# Patient Record
Sex: FEMALE | Race: WHITE | Employment: OTHER | URBAN - METROPOLITAN AREA
[De-identification: names, ages, dates, MRNs, and addresses within clinical notes are randomized per-mention and may not be internally consistent; named-entity substitution may affect disease eponyms.]

---

## 2019-06-02 ENCOUNTER — HOSPITAL ENCOUNTER (INPATIENT)
Age: 84
LOS: 1 days | Discharge: HOME | DRG: 313 | End: 2019-06-03
Attending: EMERGENCY MEDICINE | Admitting: INTERNAL MEDICINE
Payer: MEDICARE

## 2019-06-02 ENCOUNTER — APPOINTMENT (OUTPATIENT)
Dept: GENERAL RADIOLOGY | Age: 84
DRG: 313 | End: 2019-06-02
Payer: MEDICARE

## 2019-06-02 DIAGNOSIS — R11.2 NON-INTRACTABLE VOMITING WITH NAUSEA, UNSPECIFIED VOMITING TYPE: ICD-10-CM

## 2019-06-02 DIAGNOSIS — R07.9 CHEST PAIN, UNSPECIFIED TYPE: Primary | ICD-10-CM

## 2019-06-02 LAB
ALBUMIN SERPL-MCNC: 4.3 G/DL (ref 3.4–5)
ALP BLD-CCNC: 79 U/L (ref 40–129)
ALT SERPL-CCNC: 28 U/L (ref 10–40)
ANION GAP SERPL CALCULATED.3IONS-SCNC: 12 MMOL/L (ref 3–16)
AST SERPL-CCNC: 45 U/L (ref 15–37)
BASOPHILS ABSOLUTE: 0.1 K/UL (ref 0–0.2)
BASOPHILS RELATIVE PERCENT: 0.4 %
BILIRUB SERPL-MCNC: 1.1 MG/DL (ref 0–1)
BILIRUBIN DIRECT: 0.5 MG/DL (ref 0–0.3)
BILIRUBIN, INDIRECT: 0.6 MG/DL (ref 0–1)
BUN BLDV-MCNC: 30 MG/DL (ref 7–20)
CALCIUM SERPL-MCNC: 9.8 MG/DL (ref 8.3–10.6)
CHLORIDE BLD-SCNC: 94 MMOL/L (ref 99–110)
CO2: 29 MMOL/L (ref 21–32)
CREAT SERPL-MCNC: 1.2 MG/DL (ref 0.6–1.2)
EOSINOPHILS ABSOLUTE: 0.1 K/UL (ref 0–0.6)
EOSINOPHILS RELATIVE PERCENT: 1.1 %
GFR AFRICAN AMERICAN: 52
GFR NON-AFRICAN AMERICAN: 43
GLUCOSE BLD-MCNC: 121 MG/DL (ref 70–99)
HCT VFR BLD CALC: 33.9 % (ref 36–48)
HEMOGLOBIN: 11.2 G/DL (ref 12–16)
LIPASE: 32 U/L (ref 13–60)
LYMPHOCYTES ABSOLUTE: 0.8 K/UL (ref 1–5.1)
LYMPHOCYTES RELATIVE PERCENT: 6.9 %
MCH RBC QN AUTO: 31.2 PG (ref 26–34)
MCHC RBC AUTO-ENTMCNC: 33.2 G/DL (ref 31–36)
MCV RBC AUTO: 93.8 FL (ref 80–100)
MONOCYTES ABSOLUTE: 0.7 K/UL (ref 0–1.3)
MONOCYTES RELATIVE PERCENT: 5.9 %
NEUTROPHILS ABSOLUTE: 10.2 K/UL (ref 1.7–7.7)
NEUTROPHILS RELATIVE PERCENT: 85.7 %
PDW BLD-RTO: 13.3 % (ref 12.4–15.4)
PERFORMED ON: NORMAL
PLATELET # BLD: 275 K/UL (ref 135–450)
PMV BLD AUTO: 7.7 FL (ref 5–10.5)
POC SAMPLE TYPE: NORMAL
POC TROPONIN I: 0.01 NG/ML (ref 0–0.1)
POTASSIUM REFLEX MAGNESIUM: 4.6 MMOL/L (ref 3.5–5.1)
PRO-BNP: 330 PG/ML (ref 0–449)
RBC # BLD: 3.61 M/UL (ref 4–5.2)
SODIUM BLD-SCNC: 135 MMOL/L (ref 136–145)
TOTAL PROTEIN: 7.1 G/DL (ref 6.4–8.2)
TROPONIN: 0.02 NG/ML
TROPONIN: <0.01 NG/ML
TROPONIN: <0.01 NG/ML
WBC # BLD: 11.9 K/UL (ref 4–11)

## 2019-06-02 PROCEDURE — 84484 ASSAY OF TROPONIN QUANT: CPT

## 2019-06-02 PROCEDURE — 6370000000 HC RX 637 (ALT 250 FOR IP): Performed by: INTERNAL MEDICINE

## 2019-06-02 PROCEDURE — 93005 ELECTROCARDIOGRAM TRACING: CPT | Performed by: PHYSICIAN ASSISTANT

## 2019-06-02 PROCEDURE — 6370000000 HC RX 637 (ALT 250 FOR IP): Performed by: PHYSICIAN ASSISTANT

## 2019-06-02 PROCEDURE — 80061 LIPID PANEL: CPT

## 2019-06-02 PROCEDURE — 36415 COLL VENOUS BLD VENIPUNCTURE: CPT

## 2019-06-02 PROCEDURE — 85025 COMPLETE CBC W/AUTO DIFF WBC: CPT

## 2019-06-02 PROCEDURE — 2580000003 HC RX 258: Performed by: INTERNAL MEDICINE

## 2019-06-02 PROCEDURE — 83880 ASSAY OF NATRIURETIC PEPTIDE: CPT

## 2019-06-02 PROCEDURE — 71046 X-RAY EXAM CHEST 2 VIEWS: CPT

## 2019-06-02 PROCEDURE — 6360000002 HC RX W HCPCS: Performed by: INTERNAL MEDICINE

## 2019-06-02 PROCEDURE — 93005 ELECTROCARDIOGRAM TRACING: CPT | Performed by: EMERGENCY MEDICINE

## 2019-06-02 PROCEDURE — 1200000000 HC SEMI PRIVATE

## 2019-06-02 PROCEDURE — 80048 BASIC METABOLIC PNL TOTAL CA: CPT

## 2019-06-02 PROCEDURE — 83690 ASSAY OF LIPASE: CPT

## 2019-06-02 PROCEDURE — 96372 THER/PROPH/DIAG INJ SC/IM: CPT

## 2019-06-02 PROCEDURE — 80076 HEPATIC FUNCTION PANEL: CPT

## 2019-06-02 PROCEDURE — 99285 EMERGENCY DEPT VISIT HI MDM: CPT

## 2019-06-02 RX ORDER — ATORVASTATIN CALCIUM 40 MG/1
40 TABLET, FILM COATED ORAL NIGHTLY
Status: DISCONTINUED | OUTPATIENT
Start: 2019-06-02 | End: 2019-06-03 | Stop reason: HOSPADM

## 2019-06-02 RX ORDER — LISINOPRIL 10 MG/1
40 TABLET ORAL DAILY
COMMUNITY

## 2019-06-02 RX ORDER — ATORVASTATIN CALCIUM 40 MG/1
40 TABLET, FILM COATED ORAL NIGHTLY
COMMUNITY

## 2019-06-02 RX ORDER — ATORVASTATIN CALCIUM 40 MG/1
80 TABLET, FILM COATED ORAL NIGHTLY
Status: DISCONTINUED | OUTPATIENT
Start: 2019-06-02 | End: 2019-06-02 | Stop reason: DRUGHIGH

## 2019-06-02 RX ORDER — AMLODIPINE BESYLATE 5 MG/1
5 TABLET ORAL DAILY
Status: DISCONTINUED | OUTPATIENT
Start: 2019-06-02 | End: 2019-06-02 | Stop reason: DRUGHIGH

## 2019-06-02 RX ORDER — AMLODIPINE BESYLATE 10 MG/1
10 TABLET ORAL NIGHTLY
COMMUNITY

## 2019-06-02 RX ORDER — NITROGLYCERIN 0.4 MG/1
0.4 TABLET SUBLINGUAL EVERY 5 MIN PRN
Status: DISCONTINUED | OUTPATIENT
Start: 2019-06-02 | End: 2019-06-03 | Stop reason: HOSPADM

## 2019-06-02 RX ORDER — SODIUM CHLORIDE 0.9 % (FLUSH) 0.9 %
10 SYRINGE (ML) INJECTION EVERY 12 HOURS SCHEDULED
Status: DISCONTINUED | OUTPATIENT
Start: 2019-06-02 | End: 2019-06-03 | Stop reason: HOSPADM

## 2019-06-02 RX ORDER — ASPIRIN 81 MG/1
81 TABLET, CHEWABLE ORAL DAILY
Status: DISCONTINUED | OUTPATIENT
Start: 2019-06-03 | End: 2019-06-03 | Stop reason: HOSPADM

## 2019-06-02 RX ORDER — HYDROCHLOROTHIAZIDE 25 MG/1
25 TABLET ORAL DAILY
Status: DISCONTINUED | OUTPATIENT
Start: 2019-06-03 | End: 2019-06-03 | Stop reason: HOSPADM

## 2019-06-02 RX ORDER — AMLODIPINE BESYLATE 10 MG/1
10 TABLET ORAL NIGHTLY
Status: DISCONTINUED | OUTPATIENT
Start: 2019-06-02 | End: 2019-06-03 | Stop reason: HOSPADM

## 2019-06-02 RX ORDER — LISINOPRIL 40 MG/1
40 TABLET ORAL DAILY
Status: DISCONTINUED | OUTPATIENT
Start: 2019-06-03 | End: 2019-06-03 | Stop reason: HOSPADM

## 2019-06-02 RX ORDER — SODIUM CHLORIDE 0.9 % (FLUSH) 0.9 %
10 SYRINGE (ML) INJECTION PRN
Status: DISCONTINUED | OUTPATIENT
Start: 2019-06-02 | End: 2019-06-03 | Stop reason: HOSPADM

## 2019-06-02 RX ORDER — LEVOTHYROXINE SODIUM 88 UG/1
88 TABLET ORAL DAILY
Status: DISCONTINUED | OUTPATIENT
Start: 2019-06-03 | End: 2019-06-03 | Stop reason: HOSPADM

## 2019-06-02 RX ORDER — HYDROCHLOROTHIAZIDE 25 MG/1
25 TABLET ORAL DAILY
COMMUNITY

## 2019-06-02 RX ORDER — ONDANSETRON 2 MG/ML
4 INJECTION INTRAMUSCULAR; INTRAVENOUS EVERY 6 HOURS PRN
Status: DISCONTINUED | OUTPATIENT
Start: 2019-06-02 | End: 2019-06-03 | Stop reason: HOSPADM

## 2019-06-02 RX ORDER — ASPIRIN 81 MG/1
324 TABLET, CHEWABLE ORAL ONCE
Status: COMPLETED | OUTPATIENT
Start: 2019-06-02 | End: 2019-06-02

## 2019-06-02 RX ADMIN — AMLODIPINE BESYLATE 10 MG: 10 TABLET ORAL at 22:12

## 2019-06-02 RX ADMIN — ASPIRIN 81 MG 324 MG: 81 TABLET ORAL at 17:29

## 2019-06-02 RX ADMIN — ATORVASTATIN CALCIUM 40 MG: 40 TABLET, FILM COATED ORAL at 22:12

## 2019-06-02 RX ADMIN — ENOXAPARIN SODIUM 40 MG: 40 INJECTION SUBCUTANEOUS at 22:12

## 2019-06-02 RX ADMIN — Medication 10 ML: at 22:12

## 2019-06-02 RX ADMIN — LIDOCAINE HYDROCHLORIDE: 20 SOLUTION ORAL; TOPICAL at 17:38

## 2019-06-02 ASSESSMENT — ENCOUNTER SYMPTOMS
ABDOMINAL PAIN: 0
DIARRHEA: 0
SHORTNESS OF BREATH: 0
COUGH: 0
SORE THROAT: 0
BLOOD IN STOOL: 0
NAUSEA: 1
VOMITING: 1

## 2019-06-02 ASSESSMENT — PAIN DESCRIPTION - DESCRIPTORS: DESCRIPTORS: DULL

## 2019-06-02 ASSESSMENT — PAIN SCALES - GENERAL
PAINLEVEL_OUTOF10: 5
PAINLEVEL_OUTOF10: 6

## 2019-06-02 ASSESSMENT — PAIN DESCRIPTION - LOCATION: LOCATION: CHEST

## 2019-06-02 ASSESSMENT — PAIN DESCRIPTION - PAIN TYPE: TYPE: ACUTE PAIN

## 2019-06-02 ASSESSMENT — PAIN DESCRIPTION - ORIENTATION: ORIENTATION: MID

## 2019-06-02 NOTE — ED PROVIDER NOTES
ED Attending Attestation Note     Date of evaluation: 6/2/2019    This patient was seen by the advance practice provider. I have seen and examined the patient, agree with the workup, evaluation, management and diagnosis. The care plan has been discussed. I have reviewed the ECG and concur with the JESS's interpretation. My assessment reveals ill-appearing female in no acute distress who presents today with nausea and emesis chest pain. The patient felt well this morning and then at lunch today had a meal felt nauseous and had an episode of emesis. Had chest pain. She states she is not sure whether she had chest pain prior to vomiting or after the attack pain continued. This was about 12 or 12:30 and she presents today here around 4:30. She endorses mild anterior chest pain, no sob.   She has clear lung sounds bilaterally, regular rate and rhythm with a soft and benign abdominal examination and no lower extremity swelling     Tereza Angulo MD  06/02/19 [id number]

## 2019-06-02 NOTE — ED PROVIDER NOTES
[address] [facility] [address] ENCOUNTER          PHYSICIAN ASSISTANT NOTE       Date of evaluation: 6/2/2019    Chief Complaint     Chest Pain and Emesis      History of Present Illness     Judy Peterson is a 80 y.o. female who presents with chest pain. Patient states that she ate some lunch. She developed some indigestion which is not atypical for her. She states she did vomit once and then developed some chest pressure. She states she has never had a chest pain like this in the past.  She has had a stress test back in 2014 which was negative. She states that the pressure has not gone away. Over the past year she has been having some intermittent shortness of breath with exertion. She did not think much of it. She denies any cough congestion sore throat hemoptysis. No new swelling or pain in her legs. She denies any recent trauma surgeries or immobilizations. She does still have a gallbladder. She denies any abdominal pain. No fevers or chills. No history of DVT or PE and she has had a mini stroke in the past however no history of MI or CVA. Review of Systems     Review of Systems   Constitutional: Negative for chills and fever. HENT: Negative for ear pain and sore throat. Respiratory: Negative for cough and shortness of breath. Cardiovascular: Positive for chest pain and leg swelling (chronic). Gastrointestinal: Positive for nausea and vomiting. Negative for abdominal pain, blood in stool and diarrhea. Genitourinary: Negative for dysuria and hematuria. Skin: Negative for rash and wound. Neurological: Negative for light-headedness, numbness and headaches. Past Medical, Surgical, Family, and Social History     She has a past medical history of Cancer ([address] Hyperlipidemia, Hypertension, and Thyroid disease. She has a past surgical history that includes Breast surgery. Her family history is not on file. She reports that she has quit smoking.  She has never used Abnormal EKG: Sinus rhythm rate of 64  Comparison:  2016 which does show change    Repeat EKG shows normal sinus rhythm rate 55. Changes appear to have resolved. RADIOLOGY:  XR CHEST STANDARD (2 VW)   Final Result      No active cardiopulmonary disease.                       LABS:   Results for orders placed or performed during the hospital encounter of 06/02/19   Hepatic Function Panel   Result Value Ref Range    Total Protein 7.1 6.4 - 8.2 g/dL    Alb 4.3 3.4 - 5.0 g/dL    Alkaline Phosphatase 79 40 - 129 U/L    ALT 28 10 - 40 U/L    AST 45 (H) 15 - 37 U/L    Total Bilirubin 1.1 (H) 0.0 - 1.0 mg/dL    Bilirubin, Direct 0.5 (H) 0.0 - 0.3 mg/dL    Bilirubin, Indirect 0.6 0.0 - 1.0 mg/dL   Lipase   Result Value Ref Range    Lipase 32.0 13.0 - 60.0 U/L   CBC Auto Differential   Result Value Ref Range    WBC 11.9 (H) 4.0 - 11.0 K/uL    RBC 3.61 (L) 4.00 - 5.20 M/uL    Hemoglobin 11.2 (L) 12.0 - 16.0 g/dL    Hematocrit 33.9 (L) 36.0 - 48.0 %    MCV 93.8 80.0 - 100.0 fL    MCH 31.2 26.0 - 34.0 pg    MCHC 33.2 31.0 - 36.0 g/dL    RDW 13.3 12.4 - 15.4 %    Platelets 972 901 - 744 K/uL    MPV 7.7 5.0 - 10.5 fL    Neutrophils % 85.7 %    Lymphocytes % 6.9 %    Monocytes % 5.9 %    Eosinophils % 1.1 %    Basophils % 0.4 %    Neutrophils # 10.2 (H) 1.7 - 7.7 K/uL    Lymphocytes # 0.8 (L) 1.0 - 5.1 K/uL    Monocytes # 0.7 0.0 - 1.3 K/uL    Eosinophils # 0.1 0.0 - 0.6 K/uL    Basophils # 0.1 0.0 - 0.2 K/uL   Basic Metabolic Panel w/ Reflex to MG   Result Value Ref Range    Sodium 135 (L) 136 - 145 mmol/L    Potassium reflex Magnesium 4.6 3.5 - 5.1 mmol/L    Chloride 94 (L) 99 - 110 mmol/L    CO2 29 21 - 32 mmol/L    Anion Gap 12 3 - 16    Glucose 121 (H) 70 - 99 mg/dL    BUN 30 (H) 7 - 20 mg/dL    CREATININE 1.2 0.6 - 1.2 mg/dL    GFR Non- 43 (A) >60    GFR  52 (A) >60    Calcium 9.8 8.3 - 10.6 mg/dL   Brain Natriuretic Peptide   Result Value Ref Range    Pro- 0 - 449 pg/mL   Troponin

## 2019-06-02 NOTE — ED TRIAGE NOTES
Pt arrived to ED with chest pain and vomiting that started around 1200. States she started with heartburn after eating some fruit and then began vomiting. After vomiting her chest pain became more intense. Took antacid with minimal improvement.

## 2019-06-03 VITALS
SYSTOLIC BLOOD PRESSURE: 120 MMHG | RESPIRATION RATE: 18 BRPM | TEMPERATURE: 97.9 F | HEART RATE: 60 BPM | HEIGHT: 66 IN | OXYGEN SATURATION: 97 % | BODY MASS INDEX: 24.6 KG/M2 | WEIGHT: 153.1 LBS | DIASTOLIC BLOOD PRESSURE: 72 MMHG

## 2019-06-03 LAB
ALBUMIN SERPL-MCNC: 3.8 G/DL (ref 3.4–5)
ANION GAP SERPL CALCULATED.3IONS-SCNC: 10 MMOL/L (ref 3–16)
BILIRUBIN URINE: NEGATIVE
BLOOD, URINE: ABNORMAL
BUN BLDV-MCNC: 22 MG/DL (ref 7–20)
CALCIUM SERPL-MCNC: 9.5 MG/DL (ref 8.3–10.6)
CHLORIDE BLD-SCNC: 100 MMOL/L (ref 99–110)
CHOLESTEROL, TOTAL: 155 MG/DL (ref 0–199)
CLARITY: CLEAR
CO2: 29 MMOL/L (ref 21–32)
COLOR: YELLOW
CREAT SERPL-MCNC: 1 MG/DL (ref 0.6–1.2)
EKG ATRIAL RATE: 56 BPM
EKG ATRIAL RATE: 64 BPM
EKG ATRIAL RATE: 65 BPM
EKG DIAGNOSIS: NORMAL
EKG P AXIS: 38 DEGREES
EKG P AXIS: 53 DEGREES
EKG P AXIS: 60 DEGREES
EKG P-R INTERVAL: 170 MS
EKG P-R INTERVAL: 184 MS
EKG P-R INTERVAL: 186 MS
EKG Q-T INTERVAL: 422 MS
EKG Q-T INTERVAL: 434 MS
EKG Q-T INTERVAL: 446 MS
EKG QRS DURATION: 86 MS
EKG QRS DURATION: 86 MS
EKG QRS DURATION: 92 MS
EKG QTC CALCULATION (BAZETT): 430 MS
EKG QTC CALCULATION (BAZETT): 435 MS
EKG QTC CALCULATION (BAZETT): 451 MS
EKG R AXIS: 18 DEGREES
EKG R AXIS: 6 DEGREES
EKG R AXIS: 8 DEGREES
EKG T AXIS: 43 DEGREES
EKG T AXIS: 53 DEGREES
EKG T AXIS: 55 DEGREES
EKG VENTRICULAR RATE: 56 BPM
EKG VENTRICULAR RATE: 64 BPM
EKG VENTRICULAR RATE: 65 BPM
GFR AFRICAN AMERICAN: >60
GFR NON-AFRICAN AMERICAN: 53
GLUCOSE BLD-MCNC: 88 MG/DL (ref 70–99)
GLUCOSE URINE: NEGATIVE MG/DL
HCT VFR BLD CALC: 32 % (ref 36–48)
HDLC SERPL-MCNC: 74 MG/DL (ref 40–60)
HEMOGLOBIN: 10.8 G/DL (ref 12–16)
KETONES, URINE: NEGATIVE MG/DL
LDL CHOLESTEROL CALCULATED: 69 MG/DL
LEUKOCYTE ESTERASE, URINE: NEGATIVE
LV EF: 58 %
LV EF: 70 %
LVEF MODALITY: NORMAL
LVEF MODALITY: NORMAL
MCH RBC QN AUTO: 31.9 PG (ref 26–34)
MCHC RBC AUTO-ENTMCNC: 33.7 G/DL (ref 31–36)
MCV RBC AUTO: 94.6 FL (ref 80–100)
MICROSCOPIC EXAMINATION: YES
NITRITE, URINE: NEGATIVE
PDW BLD-RTO: 13.7 % (ref 12.4–15.4)
PH UA: 7 (ref 5–8)
PHOSPHORUS: 3.1 MG/DL (ref 2.5–4.9)
PLATELET # BLD: 239 K/UL (ref 135–450)
PMV BLD AUTO: 7.4 FL (ref 5–10.5)
POTASSIUM SERPL-SCNC: 4.1 MMOL/L (ref 3.5–5.1)
PROTEIN UA: NEGATIVE MG/DL
RBC # BLD: 3.38 M/UL (ref 4–5.2)
RBC UA: NORMAL /HPF (ref 0–2)
SODIUM BLD-SCNC: 139 MMOL/L (ref 136–145)
SPECIFIC GRAVITY UA: <=1.005 (ref 1–1.03)
TRIGL SERPL-MCNC: 62 MG/DL (ref 0–150)
URINE REFLEX TO CULTURE: ABNORMAL
URINE TYPE: ABNORMAL
UROBILINOGEN, URINE: 0.2 E.U./DL
VLDLC SERPL CALC-MCNC: 12 MG/DL
WBC # BLD: 7.2 K/UL (ref 4–11)
WBC UA: NORMAL /HPF (ref 0–5)

## 2019-06-03 PROCEDURE — 99223 1ST HOSP IP/OBS HIGH 75: CPT | Performed by: INTERNAL MEDICINE

## 2019-06-03 PROCEDURE — 6370000000 HC RX 637 (ALT 250 FOR IP): Performed by: INTERNAL MEDICINE

## 2019-06-03 PROCEDURE — 36415 COLL VENOUS BLD VENIPUNCTURE: CPT

## 2019-06-03 PROCEDURE — 93005 ELECTROCARDIOGRAM TRACING: CPT | Performed by: INTERNAL MEDICINE

## 2019-06-03 PROCEDURE — 6360000002 HC RX W HCPCS: Performed by: INTERNAL MEDICINE

## 2019-06-03 PROCEDURE — 80069 RENAL FUNCTION PANEL: CPT

## 2019-06-03 PROCEDURE — 2580000003 HC RX 258: Performed by: INTERNAL MEDICINE

## 2019-06-03 PROCEDURE — 78452 HT MUSCLE IMAGE SPECT MULT: CPT

## 2019-06-03 PROCEDURE — 3430000000 HC RX DIAGNOSTIC RADIOPHARMACEUTICAL: Performed by: INTERNAL MEDICINE

## 2019-06-03 PROCEDURE — A9502 TC99M TETROFOSMIN: HCPCS | Performed by: INTERNAL MEDICINE

## 2019-06-03 PROCEDURE — 93306 TTE W/DOPPLER COMPLETE: CPT

## 2019-06-03 PROCEDURE — 93017 CV STRESS TEST TRACING ONLY: CPT

## 2019-06-03 PROCEDURE — 81001 URINALYSIS AUTO W/SCOPE: CPT

## 2019-06-03 PROCEDURE — 93010 ELECTROCARDIOGRAM REPORT: CPT | Performed by: INTERNAL MEDICINE

## 2019-06-03 PROCEDURE — 85027 COMPLETE CBC AUTOMATED: CPT

## 2019-06-03 RX ADMIN — Medication 10 ML: at 10:03

## 2019-06-03 RX ADMIN — LISINOPRIL 40 MG: 40 TABLET ORAL at 11:56

## 2019-06-03 RX ADMIN — Medication 10 ML: at 08:39

## 2019-06-03 RX ADMIN — TETROFOSMIN 10 MILLICURIE: 1.38 INJECTION, POWDER, LYOPHILIZED, FOR SOLUTION INTRAVENOUS at 08:39

## 2019-06-03 RX ADMIN — REGADENOSON 0.4 MG: 0.08 INJECTION, SOLUTION INTRAVENOUS at 10:03

## 2019-06-03 RX ADMIN — TETROFOSMIN 30 MILLICURIE: 1.38 INJECTION, POWDER, LYOPHILIZED, FOR SOLUTION INTRAVENOUS at 10:03

## 2019-06-03 RX ADMIN — HYDROCHLOROTHIAZIDE 25 MG: 25 TABLET ORAL at 11:55

## 2019-06-03 RX ADMIN — Medication 10 ML: at 11:56

## 2019-06-03 ASSESSMENT — PAIN SCALES - GENERAL
PAINLEVEL_OUTOF10: 0

## 2019-06-03 NOTE — PROGRESS NOTES
Admission: Patient received to room 6311 from ED. Patient admitted with Dx of Indigestion/cardiac workup. Patient A&Ox4 upon arrival. Patient denies c/o upon arrival.Tele monitor applied, rate and rhythm verified with monitor reader. Admission assessment as charted. VSS. Patient oriented to room, staff, and call system. Educated on fall protocol and hourly rounding. Patient informed to utilize call light with any needs. Pt verbalized understanding. Will continue to monitor.

## 2019-06-03 NOTE — PROGRESS NOTES
Pt has been NPO since midnight and without caffeine for Stress Test today. Per Dr. Mayra Steiner, hold all morning meds until after test. Pt is aware of plan and agreeable, will continue to monitor.

## 2019-06-03 NOTE — DISCHARGE SUMMARY
Pt discharge to home with daughter. Discharge paper work discussed with patient and denies any further questions . IV removed. Took patient down to lobby with daughter in to private vehicle.

## 2019-06-03 NOTE — CONSULTS
sodium chloride flush  10 mL Intravenous 2 times per day    enoxaparin  40 mg Subcutaneous Nightly    amLODIPine  10 mg Oral Nightly    atorvastatin  40 mg Oral Nightly     sodium chloride flush, magnesium hydroxide, ondansetron, nitroGLYCERIN      Allergies:  Keflet [cephalexin]     Review of Systems:   All 12 point review of symptoms completed. Pertinent positives identified in the HPI, all other review of symptoms negative as below. · Constitutional: there has been no unanticipated weight loss. · Eyes: No visual changes or diplopia. No scleral icterus. · ENT: No Headaches, hearing loss or vertigo. No mouth sores or sore throat. · Cardiovascular: No loss of consciousness. No hemoptysis, pleuritic pain, or phlebitis. · Respiratory: No cough or wheezing, no sputum production. No hematemesis. · Gastrointestinal: No abdominal pain, appetite loss, blood in stools. No change in bowel or bladder habits. · Genitourinary: No dysuria, or hematuria. · Musculoskeletal:  No gait disturbance, weakness or joint complaints. · Integumentary: No rash or pruritis. · Neurological: No headache, diplopia,numbness or tingling. No change in gait, balance, coordination, mood, affect, memory, mentation, behavior. · Psychiatric: No anxiety,  · Endocrine: No malaise,  · Hematologic/Lymphatic: No abnormal bruising  · Allergic/Immunologic: No nasal congestion      Physical Examination:    Vitals:    06/03/19 1155   BP: 120/72   Pulse: 60   Resp: 18   Temp: 97.9 °F (36.6 °C)   SpO2: 97%    Weight: 153 lb 1.6 oz (69.4 kg)         General Appearance:  Alert, cooperative, no distress, appears stated age   Head:  Normocephalic, without obvious abnormality, atraumatic   Eyes:  PERRL   Nose: Nares normal,   Neck: Supple, JVP normal    Lungs:   Clear to auscultation bilaterally, respirations unlabored   Chest Wall:  No tenderness or deformity   Heart:  Regular rate and rhythm, normal S1, S2 normal, no murmur, II/VI HSM,  No rub.  No S3 / S4  gallop   Abdomen:   Soft, non-tender, +bowel sounds   Extremities: no cyanosis, no clubbing , No LE edema   Pulses: Symmetric extremities   Skin:  no gross lesions or rashes   Pysch: Normal mood and affect   Neurologic: No gross deficits. CN II - XII grossly intact        Labs  CBC:   Lab Results   Component Value Date    WBC 7.2 06/03/2019    RBC 3.38 06/03/2019    HGB 10.8 06/03/2019    HCT 32.0 06/03/2019    MCV 94.6 06/03/2019    RDW 13.7 06/03/2019     06/03/2019     CMP:    Lab Results   Component Value Date     06/03/2019    K 4.1 06/03/2019    K 4.6 06/02/2019     06/03/2019    CO2 29 06/03/2019    BUN 22 06/03/2019    CREATININE 1.0 06/03/2019    GFRAA >60 06/03/2019    LABGLOM 53 06/03/2019    GLUCOSE 88 06/03/2019    PROT 7.1 06/02/2019    CALCIUM 9.5 06/03/2019    BILITOT 1.1 06/02/2019    ALKPHOS 79 06/02/2019    AST 45 06/02/2019    ALT 28 06/02/2019     PT/INR:  No results found for: PTINR  Recent Labs     06/02/19  1623 06/02/19  1951 06/02/19  2249   TROPONINI <0.01 <0.01 0.02*       EKG:  SR with minimal NSST/T wave changes    Assessment  Patient Active Problem List   Diagnosis    Hypertensive urgency    Hypertensive emergency without congestive heart failure    Chest pain        Impression:  Atypical chest pain. Recommendations:    I had the opportunity to review the clinical symptoms and presentation of [employer]. I recommend that the patient undergo further cardiac evaluation with an echo gxt myoview nnd if ischemia is present then would perform cardiac cath. Tobacco use was discussed with the patient and educated on the negative effects. I have asked the patient to not utilize these agents. Thank you for allowing to us to participate in the care or [facility]. All questions and concerns were addressed to the patient. Alternatives to my treatment were discussed. The note was completed using EMR.  Every effort was made to ensure accuracy; however, inadvertent computerized transcription errors may be present.        Melania Magaña MD [facility]

## 2019-06-03 NOTE — PLAN OF CARE
Problem: Pain:  Goal: Control of acute pain  Description  Control of acute pain  Outcome: Ongoing  Note:   Pt denies any pain; states the GI cocktail has completely gotten rid of any pain. Pt instructed to call RN if pain returns. Belching frequently, states she does feel hungry and is able to drink gingerale (no caffeine) and eat jello and saltine crackers. Now NPO at midnight for testing in the AM. Will continue to monitor. Pt is a low fall risk. Alert and oriented x4, walks with steady gait independently as witnessed by this RN. Pt is ambulatory at baseline and requires no aide device. Calls appropriately. No slip socks in place, bedside table and call light within reach. Will continue to monitor.

## 2019-06-03 NOTE — DISCHARGE SUMMARY
Hospital Medicine Discharge Summary    Patient: Cecille Martin     Gender: female  : 1934   Age: 80 y.o. MRN: [id number]    Code Status: Full Code    Primary Care Provider: Milka Walker Date: 2019   Discharge Date: 19    Admitting Physician: Gay Perez MD  Discharge Physician: Emmanuel Carrington MD     Discharge Diagnoses: Active Hospital Problems    Diagnosis Date Noted    Chest pain [R07.9] 2019       Hospital Course:   Pt admitted on 2019 for the symptoms(s) of chest pain. The pain was atypical on presentation, however her troponin bumped up from <0.01 to 0.02. Stress test and echocardiogram were done, but did not reveal any concern for any CAD. Her chest brian improved with GI cocktail. She was discharged in a stable condition and asked to follow-up as an outpatient. Disposition:  Home    Exam:     /72   Pulse 60   Temp 97.9 °F (36.6 °C) (Oral)   Resp 18   Ht 5' 6\" (1.676 m)   Wt 153 lb 1.6 oz (69.4 kg)   SpO2 97%   BMI 24.71 kg/m²     General appearance:  No apparent distress, appears stated age and cooperative. HEENT:  Normal cephalic, atraumatic without obvious deformity. Pupils equal, round, and reactive to light. Extra ocular muscles intact. Conjunctivae/corneas clear. Neck: Supple, with full range of motion. No jugular venous distention. Trachea midline. Respiratory:  Normal respiratory effort. Clear to auscultation, bilaterally without Rales/Wheezes/Rhonchi. Cardiovascular:  Regular rate and rhythm with normal S1/S2 without murmurs, rubs or gallops. Abdomen: Soft, non-tender, non-distended with normal bowel sounds. Musculoskeletal:  No clubbing, cyanosis or edema bilaterally. Full range of motion without deformity. Skin: Skin color, texture, turgor normal.  No rashes or lesions. Neurologic:  Neurovascularly intact without any focal sensory/motor deficits.  Cranial nerves: II-XII intact, grossly non-focal.  Psychiatric:  Alert and oriented, thought content appropriate, normal insight    Consults:     IP CONSULT TO HOSPITALIST  IP CONSULT TO CARDIOLOGY    Labs: For convenience and continuity at follow-up the following most recent labs are provided:    Lab Results   Component Value Date    WBC 7.2 06/03/2019    HGB 10.8 06/03/2019    HCT 32.0 06/03/2019    MCV 94.6 06/03/2019     06/03/2019     06/03/2019    K 4.1 06/03/2019    K 4.6 06/02/2019     06/03/2019    CO2 29 06/03/2019    BUN 22 06/03/2019    CREATININE 1.0 06/03/2019    CALCIUM 9.5 06/03/2019    PHOS 3.1 06/03/2019    .0 07/02/2016    ALKPHOS 79 06/02/2019    ALT 28 06/02/2019    AST 45 06/02/2019    BILITOT 1.1 06/02/2019    BILIDIR 0.5 06/02/2019    LABALBU 3.8 06/03/2019    LDLCALC 69 06/02/2019    TRIG 62 06/02/2019     No results found for: INR    Radiology:  NM Cardiac Stress Test Nuclear Imaging   Final Result      XR CHEST STANDARD (2 VW)   Final Result      No active cardiopulmonary disease. Discharge Medications:   Current Discharge Medication List        Current Discharge Medication List        Current Discharge Medication List      CONTINUE these medications which have NOT CHANGED    Details   lisinopril (PRINIVIL;ZESTRIL) 10 MG tablet Take 40 mg by mouth daily      hydrochlorothiazide (HYDRODIURIL) 25 MG tablet Take 25 mg by mouth daily      atorvastatin (LIPITOR) 40 MG tablet Take 40 mg by mouth nightly      amLODIPine (NORVASC) 10 MG tablet Take 10 mg by mouth nightly      Blood Pressure KIT Check blood pressure at least once a day, preferably before taking blood pressure medications.   Qty: 1 kit, Refills: 0      aspirin 81 MG chewable tablet Take 1 tablet by mouth daily  Qty: 30 tablet, Refills: 3      levothyroxine (SYNTHROID) 88 MCG tablet Take 1 tablet by mouth Daily  Qty: 30 tablet, Refills: 3           Current Discharge Medication List          Follow-up appointments and focus:    Please follow up with your primary care provider in 1-2 weeks for routine hospital follow up. Condition at Discharge:  Stable    The patient was seen and examined on day of discharge and this discharge summary is in conjunction with any daily progress note from day of discharge. Time Spent on discharge is 30 minutes  in the examination, evaluation, counseling and review of medications and discharge plan. Signed:    Julia Rich, PGY3  6/3/2019     Thank you Camille Gamez for the opportunity to be involved in this patient's care.  If you have any questions or concerns please feel free to contact me

## 2019-06-03 NOTE — PROGRESS NOTES
4 Eyes Admission Assessment     I agree as the admission nurse that 2 RN's have performed a thorough Head to Toe Skin Assessment on the patient. ALL assessment sites listed below have been assessed on admission. Areas assessed by both nurses: yes  [x]   Head, Face, and Ears   [x]   Shoulders, Back, and Chest  [x]   Arms, Elbows, and Hands   [x]   Coccyx, Sacrum, and Ischum  [x]   Legs, Feet, and Heels        Does the Patient have Skin Breakdown?   No         Thiago Prevention initiated:  Yes   Wound Care Orders initiated:  No      [facility] nurse consulted for Pressure Injury (Stage 3,4, Unstageable, DTI, NWPT, and Complex wounds):  No      Nurse 1 eSignature: Electronically signed by Marli Lee RN on 6/3/19 at 6:21 AM    **SHARE this note so that the co-signing nurse is able to place an eSignature**    Nurse 2 eSignature: {Esignature:[id number]}

## 2019-06-03 NOTE — CARE COORDINATION
Case Management Discharge Assessment    6/3/2019  [facility])  Clinical Case Management Department    Patient: Jazz Mills  MRN: [id number] / : 1934  ACCT: [de-identified]     Emergency Contacts  Healthcare Agent Appointed: Healthcare power of   Healthcare Agent's Name: Suraj Rucker Agent's Phone Number: [phone]    Admission Documentation  Attending Provider: Jose Hemphill MD  Admit date/time: 2019  4:02 PM  Status: Inpatient [101]  Diagnosis: Chest pain     Readmission within last 30 days:  no     Living Situation  :  Lives  At home  W/  daughter  And Son in [place]. Discharge Planning  Living Arrangements: Children  Support Systems: Family Members, Children, Friends/Neighbors  Potential Assistance Needed: N/A  Type of Home Care Services: None  Patient expects to be discharged to[de-identified] home with daughter and son-in-law  Expected Discharge Date: 19    Service Assessment:    Advance Directives (For Healthcare)  Pre-existing DNR Comfort Care/DNR Arrest/DNI Order: No  Healthcare Directive: Yes, patient has an advance directive for healthcare treatment  Type of Healthcare Directive: Durable power of  for health care  Copy in Chart: No, copy requested from family  [address] [facility] Agent's Name: Suraj Rucker Agent's Phone Number: [phone]  If you are unable to speak for yourself, does your Healthcare Agent or Legal Spokesperson know your healthcare wishes?: Yes      Pharmacy:   Potential Assistance Purchasing Medications:  No  Does patient want to participate in local refill/meds to beds program?: No    Notification completed in [facility]/PAS? No     IMM  No       Discharge disposition: Home     Pateint was  admitted for  CP : The pain was atypical on presentation, however her troponin bumped up from <0.01 to 0.02. Stress test and echocardiogram were done, but did not reveal any concern for any CAD.  Her chest brian improved with GI cocktail. She was discharged in a stable condition and asked to follow-up as an outpatient.      Disposition:  Home      Follow-up appointments and focus:    Please follow up with your primary care provider in 1-2 weeks for routine hospital follow up. LOC Home      Mode of Transport private car      Havery Blend and her family were provided with choice of provider; she and her family are in agreement with the discharge plan.       Care Transitions patient: Geni Steiner RN  [facility]  Case Management Department  Ph: [phone]

## 2019-06-03 NOTE — H&P
Hospital Medicine History & Physical      PCP: Muriel Carolina    Date of Admission: 6/2/2019    Date of Service: Pt seen/examined on 6/2/19 and admitted to Inpatient with expected LOS greater than two midnights due to medical therapy. Chief Complaint:  Chest pain      History Of Present Illness:      80 y.o. female with HTN, HLD and breast CA who presents with \"chest burning. \" Patient reports having fruits for lunch this am after which she started having some \"indigestion\" symptoms which were associated with nausea, vomiting and chest pressure. Denies PND, orthopnea, LE edema or swelling. With concerning symptoms, she presented to ED and was ruled out for ACS. Currently she denies chest pain, sob, fever, chills    Past Medical History:          Diagnosis Date    Cancer [facility]) 1994    left breast cancer    Hyperlipidemia     Hypertension     Thyroid disease        Past Surgical History:          Procedure Laterality Date    BREAST SURGERY         Medications Prior to Admission:      Prior to Admission medications    Medication Sig Start Date End Date Taking? Authorizing Provider   lisinopril (PRINIVIL;ZESTRIL) 10 MG tablet Take 40 mg by mouth daily   Yes Historical Provider, MD   hydrochlorothiazide (HYDRODIURIL) 25 MG tablet Take 25 mg by mouth daily   Yes Historical Provider, MD   atorvastatin (LIPITOR) 40 MG tablet Take 40 mg by mouth nightly   Yes Historical Provider, MD   amLODIPine (NORVASC) 10 MG tablet Take 10 mg by mouth nightly   Yes Historical Provider, MD   Blood Pressure KIT Check blood pressure at least once a day, preferably before taking blood pressure medications.  7/5/16  Yes Venessa Kauffman MD   aspirin 81 MG chewable tablet Take 1 tablet by mouth daily 7/5/16  Yes Venessa Kauffman MD   levothyroxine (SYNTHROID) 88 MCG tablet Take 1 tablet by mouth Daily 7/5/16  Yes Venessa Kauffman MD       Allergies:  Honora Sins [cephalexin]    Social History:      The patient currently lives at home with daughter and son-in-law    TOBACCO:   reports that she has quit smoking. She has never used smokeless tobacco.  ETOH:   reports that she drinks alcohol. Family History:      History reviewed. No pertinent family history. REVIEW OF SYSTEMS:   Pertinent positives as noted in the HPI. All other systems reviewed and negative. PHYSICAL EXAM PERFORMED:    /74   Pulse 69   Temp 97.5 °F (36.4 °C) (Oral)   Resp 18   Ht 5' 6\" (1.676 m)   Wt 172 lb 9.9 oz (78.3 kg)   SpO2 97%   BMI 27.86 kg/m²     General appearance:  No apparent distress, appears stated age and cooperative. HEENT:  Normal cephalic, atraumatic without obvious deformity. Neck: Supple, with full range of motion. No jugular venous distention. Respiratory:  Normal respiratory effort. Clear to auscultation  Cardiovascular:  Regular rate and rhythm with normal S1/S2 and + SURAJ  Abdomen: Soft, non-tender, non-distended with normal bowel sounds. Musculoskeletal:  No clubbing, cyanosis or edema bilaterally. Skin: Skin color, texture, turgor normal.  No rashes or lesions. Neurologic:  Neurovascularly intact without any focal sensory/motor deficits. Psychiatric:  Alert and oriented, thought content appropriate, normal insight  Capillary Refill: Brisk,< 3 seconds   Peripheral Pulses: +2 palpable, equal bilaterally       Labs:     Recent Labs     06/02/19  1623   WBC 11.9*   HGB 11.2*   HCT 33.9*        Recent Labs     06/02/19  1623   *   K 4.6   CL 94*   CO2 29   BUN 30*   CREATININE 1.2   CALCIUM 9.8     Recent Labs     06/02/19  1623   AST 45*   ALT 28   BILIDIR 0.5*   BILITOT 1.1*   ALKPHOS 79     No results for input(s): INR in the last 72 hours.   Recent Labs     06/02/19  1621 06/02/19  1623 06/02/19  1951   TROPONINI 0.01 <0.01 <0.01       Urinalysis:      Lab Results   Component Value Date    NITRU Negative 07/04/2016    WBCUA 10-20 07/04/2016    RBCUA None seen 07/04/2016    BLOODU TRACE-INTACT 07/04/2016    SPECGRAV 1.010 07/04/2016    GLUCOSEU Negative 07/04/2016       Radiology:     CXR: I have reviewed the CXR with the following interpretation: No active cardiopulmonary disease. EKG:  I have reviewed the EKG with the following interpretation: NSR    XR CHEST STANDARD (2 VW)   Final Result      No active cardiopulmonary disease. NM Cardiac Stress Test Nuclear Imaging    (Results Pending)       ASSESSMENT & PLAN:    Active Hospital Problems    Diagnosis Date Noted    Chest pain [R07.9] 06/02/2019       Chest pain- Atypical but women can present with atypica symptoms. EKG with possible inferior infarct   - Will continue ruling out for ACS  - Will get exercise MPI tomorrow if trop's negative and patient able to do treadmill otherwise, Portia  - Asa and lipitor   - Cardiology consult if needed   - Heparin gtt if troponin positive   - EKG's and troponin follow up    Systolic murmur- Unclear etiology   - Will get a TTE    HTN- Stable  - Continue Norvasc, Lisinopril and HCTZ    Leukocytosis- Likely reactive   - Will get UA      DVT Prophylaxis: Lovenox  Diet: DIET GENERAL; No Caffeine  Diet NPO, After Midnight  Diet NPO, After Midnight  Code Status: Full Code    PT/OT Eval Status: Pending    Dispo - Pending       Damari Wilson MD    Thank you Marion Daly for the opportunity to be involved in this patient's care. If you have any questions or concerns please feel free to contact me at [phone].

## 2019-06-03 NOTE — PROGRESS NOTES
gallops. Abdomen: Soft, non-tender, non-distended with normal bowel sounds. Musculoskeletal: No clubbing, cyanosis or edema bilaterally. Full range of motion without deformity. Skin: Skin color, texture, turgor normal.  No rashes or lesions. Neurologic:  Neurovascularly intact without any focal sensory/motor deficits. Cranial nerves: II-XII intact, grossly non-focal.  Psychiatric: Alert and oriented, thought content appropriate, normal insight  Capillary Refill: Brisk,< 3 seconds   Peripheral Pulses: +2 palpable, equal bilaterally     Labs:   Recent Labs     06/02/19  1623 06/03/19  0648   WBC 11.9* 7.2   HGB 11.2* 10.8*   HCT 33.9* 32.0*    239     Recent Labs     06/02/19  1623 06/03/19  0648   * 139   K 4.6 4.1   CL 94* 100   CO2 29 29   BUN 30* 22*   CREATININE 1.2 1.0   CALCIUM 9.8 9.5   PHOS  --  3.1     Recent Labs     06/02/19  1623   AST 45*   ALT 28   BILIDIR 0.5*   BILITOT 1.1*   ALKPHOS 79     No results for input(s): INR in the last 72 hours. Recent Labs     06/02/19  1623 06/02/19  1951 06/02/19  2249   TROPONINI <0.01 <0.01 0.02*       Urinalysis:      Lab Results   Component Value Date    NITRU Negative 06/03/2019    WBCUA 0-2 06/03/2019    RBCUA 0-2 06/03/2019    BLOODU TRACE-LYSED 06/03/2019    SPECGRAV <=1.005 06/03/2019    GLUCOSEU Negative 06/03/2019       Radiology:  XR CHEST STANDARD (2 VW)   Final Result      No active cardiopulmonary disease. NM Cardiac Stress Test Nuclear Imaging    (Results Pending)           Assessment/Plan:    81 yo F with PMHx of Breast Cancer, HLD, HTN.      Chest Burning -  possible atypical chest pain vs. Heart Burn - pt reports she has had it in the past, resolved with GI Cocktail yesterday and omeprazole in the past; EKG with possible inferior infarct; troponin <0.01 X 2, 0.02 once   - f/u stress test today  - Cardiology following - recommended Echo Gxt Myoview and possible cath lab tomorrow  - continue asprin and lipitor   - start omeprazole 20mg daily, f/u with PCP     Systolic Murmur - Chronic  - previous Echo in 2017 with mild aortic stenosis     HTN - Stable  - continue norvasc, lisinopril and HCTZ     Leukocytosis - WBC 11.9 to 7.2 this AM - resolved. - UA negative.     DVT Prophylaxis: Lovenox  Diet: DIET GENERAL; No Caffeine     Robi Jordan, MS4 acted as a scribe for me.  I will discuss the patient with the senior resident and Jaron Fisher MD

## 2019-06-03 NOTE — PROGRESS NOTES
Patient down to [facility] [place] [facility] for [place] test

## 2019-06-03 NOTE — PLAN OF CARE
Problem: Pain:  Goal: Control of acute pain  Description  Control of acute pain  6/3/2019 1444 by Felipa Zhao RN  Outcome: Ongoing  Note:   Patient has no c/o pain this shift. Chest pain/discomfort resolved over night. Pt has no complaints at this time and is resting comfortably in bed. 6/3/2019 0230 by Carmelo Goodrich RN  Outcome: Ongoing  Note:   Pt denies any pain; states the GI cocktail has completely gotten rid of any pain. Pt instructed to call RN if pain returns. Belching frequently, states she does feel hungry and is able to drink gingerale (no caffeine) and eat jello and saltine crackers. Now NPO at midnight for testing in the AM. Will continue to monitor.

## 2025-09-06 ENCOUNTER — HOSPITAL ENCOUNTER (EMERGENCY)
Age: 89
Discharge: HOME | End: 2025-09-06
Attending: STUDENT IN AN ORGANIZED HEALTH CARE EDUCATION/TRAINING PROGRAM
Payer: MEDICARE

## 2025-09-06 ENCOUNTER — APPOINTMENT (OUTPATIENT)
Dept: GENERAL RADIOLOGY | Age: 89
End: 2025-09-06
Payer: MEDICARE

## 2025-09-06 VITALS
RESPIRATION RATE: 16 BRPM | SYSTOLIC BLOOD PRESSURE: 149 MMHG | BODY MASS INDEX: 27.34 KG/M2 | TEMPERATURE: 98 F | HEART RATE: 73 BPM | HEIGHT: 65 IN | DIASTOLIC BLOOD PRESSURE: 73 MMHG | WEIGHT: 164.1 LBS | OXYGEN SATURATION: 99 %

## 2025-09-06 DIAGNOSIS — L03.115 CELLULITIS OF RIGHT LOWER EXTREMITY: Primary | ICD-10-CM

## 2025-09-06 LAB
ANION GAP SERPL CALCULATED.3IONS-SCNC: 12 MMOL/L (ref 3–16)
BASOPHILS # BLD: 0.1 K/UL (ref 0–0.2)
BASOPHILS NFR BLD: 0.8 %
BUN SERPL-MCNC: 21 MG/DL (ref 7–20)
CALCIUM SERPL-MCNC: 9.6 MG/DL (ref 8.3–10.6)
CHLORIDE SERPL-SCNC: 98 MMOL/L (ref 99–110)
CO2 SERPL-SCNC: 22 MMOL/L (ref 21–32)
CREAT SERPL-MCNC: 1.2 MG/DL (ref 0.6–1.2)
DEPRECATED RDW RBC AUTO: 14.6 % (ref 12.4–15.4)
EOSINOPHIL # BLD: 0.1 K/UL (ref 0–0.6)
EOSINOPHIL NFR BLD: 1.6 %
GFR SERPLBLD CREATININE-BSD FMLA CKD-EPI: 43 ML/MIN/{1.73_M2}
GLUCOSE SERPL-MCNC: 117 MG/DL (ref 70–99)
HCT VFR BLD AUTO: 28.6 % (ref 36–48)
HGB BLD-MCNC: 9.8 G/DL (ref 12–16)
LYMPHOCYTES # BLD: 0.9 K/UL (ref 1–5.1)
LYMPHOCYTES NFR BLD: 11.6 %
MCH RBC QN AUTO: 31.6 PG (ref 26–34)
MCHC RBC AUTO-ENTMCNC: 34.3 G/DL (ref 31–36)
MCV RBC AUTO: 92.1 FL (ref 80–100)
MONOCYTES # BLD: 0.6 K/UL (ref 0–1.3)
MONOCYTES NFR BLD: 7.4 %
NEUTROPHILS # BLD: 6.4 K/UL (ref 1.7–7.7)
NEUTROPHILS NFR BLD: 78.6 %
PLATELET # BLD AUTO: 274 K/UL (ref 135–450)
PMV BLD AUTO: 7.4 FL (ref 5–10.5)
POTASSIUM SERPL-SCNC: 4.5 MMOL/L (ref 3.5–5.1)
RBC # BLD AUTO: 3.11 M/UL (ref 4–5.2)
SODIUM SERPL-SCNC: 132 MMOL/L (ref 136–145)
WBC # BLD AUTO: 8.1 K/UL (ref 4–11)

## 2025-09-06 PROCEDURE — 80048 BASIC METABOLIC PNL TOTAL CA: CPT

## 2025-09-06 PROCEDURE — 73562 X-RAY EXAM OF KNEE 3: CPT

## 2025-09-06 PROCEDURE — 85025 COMPLETE CBC W/AUTO DIFF WBC: CPT

## 2025-09-06 PROCEDURE — 6370000000 HC RX 637 (ALT 250 FOR IP): Performed by: STUDENT IN AN ORGANIZED HEALTH CARE EDUCATION/TRAINING PROGRAM

## 2025-09-06 PROCEDURE — 36415 COLL VENOUS BLD VENIPUNCTURE: CPT

## 2025-09-06 RX ORDER — DICLOXACILLIN SODIUM 500 MG/1
500 CAPSULE ORAL 4 TIMES DAILY
Qty: 28 CAPSULE | Refills: 0 | Status: SHIPPED | OUTPATIENT
Start: 2025-09-06 | End: 2025-09-13

## 2025-09-06 RX ORDER — ONDANSETRON 8 MG/1
8 TABLET, ORALLY DISINTEGRATING ORAL EVERY 8 HOURS PRN
Qty: 30 TABLET | Refills: 0 | Status: SHIPPED | OUTPATIENT
Start: 2025-09-06

## 2025-09-06 RX ORDER — DICLOXACILLIN SODIUM 250 MG/1
500 CAPSULE ORAL ONCE
Status: COMPLETED | OUTPATIENT
Start: 2025-09-06 | End: 2025-09-06

## 2025-09-06 RX ADMIN — DICLOXACILLIN SODIUM 500 MG: 250 CAPSULE ORAL at 17:40

## 2025-09-06 ASSESSMENT — LIFESTYLE VARIABLES
HOW OFTEN DO YOU HAVE A DRINK CONTAINING ALCOHOL: NEVER
HOW MANY STANDARD DRINKS CONTAINING ALCOHOL DO YOU HAVE ON A TYPICAL DAY: PATIENT DOES NOT DRINK

## 2025-09-06 ASSESSMENT — PAIN SCALES - GENERAL: PAINLEVEL_OUTOF10: 0

## 2025-09-06 ASSESSMENT — PAIN - FUNCTIONAL ASSESSMENT: PAIN_FUNCTIONAL_ASSESSMENT: 0-10

## 2025-09-06 ASSESSMENT — ENCOUNTER SYMPTOMS: SHORTNESS OF BREATH: 0
